# Patient Record
Sex: MALE
[De-identification: names, ages, dates, MRNs, and addresses within clinical notes are randomized per-mention and may not be internally consistent; named-entity substitution may affect disease eponyms.]

---

## 2018-12-29 ENCOUNTER — HOSPITAL ENCOUNTER (INPATIENT)
Dept: HOSPITAL 5 - ED | Age: 48
LOS: 1 days | Discharge: LEFT BEFORE DISCHARGE | DRG: 603 | End: 2018-12-30
Attending: INTERNAL MEDICINE
Payer: COMMERCIAL

## 2018-12-29 DIAGNOSIS — L03.114: Primary | ICD-10-CM

## 2018-12-29 DIAGNOSIS — F17.200: ICD-10-CM

## 2018-12-29 DIAGNOSIS — Z80.9: ICD-10-CM

## 2018-12-29 DIAGNOSIS — F11.10: ICD-10-CM

## 2018-12-29 DIAGNOSIS — Z53.21: ICD-10-CM

## 2018-12-29 LAB
ALBUMIN SERPL-MCNC: 4 G/DL (ref 3.9–5)
ALT SERPL-CCNC: 38 UNITS/L (ref 7–56)
BASOPHILS # (AUTO): 0.1 K/MM3 (ref 0–0.1)
BASOPHILS NFR BLD AUTO: 0.6 % (ref 0–1.8)
BUN SERPL-MCNC: 16 MG/DL (ref 9–20)
BUN/CREAT SERPL: 20 %
CALCIUM SERPL-MCNC: 9.3 MG/DL (ref 8.4–10.2)
EOSINOPHIL # BLD AUTO: 0.1 K/MM3 (ref 0–0.4)
EOSINOPHIL NFR BLD AUTO: 0.9 % (ref 0–4.3)
HCT VFR BLD CALC: 46.5 % (ref 35.5–45.6)
HEMOLYSIS INDEX: 8
HGB BLD-MCNC: 16 GM/DL (ref 11.8–15.2)
LYMPHOCYTES # BLD AUTO: 1.1 K/MM3 (ref 1.2–5.4)
LYMPHOCYTES NFR BLD AUTO: 12.5 % (ref 13.4–35)
MCH RBC QN AUTO: 32 PG (ref 28–32)
MCHC RBC AUTO-ENTMCNC: 34 % (ref 32–34)
MCV RBC AUTO: 94 FL (ref 84–94)
MONOCYTES # (AUTO): 0.9 K/MM3 (ref 0–0.8)
MONOCYTES % (AUTO): 10 % (ref 0–7.3)
PLATELET # BLD: 180 K/MM3 (ref 140–440)
RBC # BLD AUTO: 4.96 M/MM3 (ref 3.65–5.03)

## 2018-12-29 PROCEDURE — 82550 ASSAY OF CK (CPK): CPT

## 2018-12-29 PROCEDURE — 99406 BEHAV CHNG SMOKING 3-10 MIN: CPT

## 2018-12-29 PROCEDURE — 90715 TDAP VACCINE 7 YRS/> IM: CPT

## 2018-12-29 PROCEDURE — 87116 MYCOBACTERIA CULTURE: CPT

## 2018-12-29 PROCEDURE — 90686 IIV4 VACC NO PRSV 0.5 ML IM: CPT

## 2018-12-29 PROCEDURE — 87076 CULTURE ANAEROBE IDENT EACH: CPT

## 2018-12-29 PROCEDURE — 85652 RBC SED RATE AUTOMATED: CPT

## 2018-12-29 PROCEDURE — 87186 SC STD MICRODIL/AGAR DIL: CPT

## 2018-12-29 PROCEDURE — 85025 COMPLETE CBC W/AUTO DIFF WBC: CPT

## 2018-12-29 PROCEDURE — 82140 ASSAY OF AMMONIA: CPT

## 2018-12-29 PROCEDURE — 87040 BLOOD CULTURE FOR BACTERIA: CPT

## 2018-12-29 PROCEDURE — 36415 COLL VENOUS BLD VENIPUNCTURE: CPT

## 2018-12-29 PROCEDURE — 82805 BLOOD GASES W/O2 SATURATION: CPT

## 2018-12-29 PROCEDURE — 80307 DRUG TEST PRSMV CHEM ANLYZR: CPT

## 2018-12-29 PROCEDURE — 80053 COMPREHEN METABOLIC PANEL: CPT

## 2018-12-29 NOTE — EMERGENCY DEPARTMENT REPORT
ED Extremity Problem HPI





- General


Chief complaint: Extremity Problem,Nontraumatic


Stated complaint: SWOLLEN L ARM


Time Seen by Provider: 12/29/18 22:53


Source: patient, old records reviewed


Mode of arrival: Ambulatory


Limitations: No Limitations





- History of Present Illness


Initial comments: 





48-year-old right-hand dominant male with a past medical history previous staph 

infection presents to hospital complaining of pain, swelling, redness, and 

drainage from left arm and wrist.  Patient admits to shooting up heroin in the 

mid left forearm 2 days ago prior to symptom onset.  Patient states he typically

does not use IV drugs but used to days ago because her friend had some heroin.  

Patient has history of bilateral axilla MRSA infections while he was 

incarcerated.  He cannot remember if he has received tetanus within 10 years.  

Patient complains of moderate to severe pain to his left forearm and wrist with 

watery serous drainage.  Subjective fever reported.





- Related Data


                                    Allergies











Allergy/AdvReac Type Severity Reaction Status Date / Time


 


No Known Allergies Allergy   Unverified 12/29/18 21:35














ED Review of Systems


ROS: 


Stated complaint: SWOLLEN L ARM


Other details as noted in HPI





Comment: All other systems reviewed and negative





ED Past Medical Hx





- Past Medical History


Additional medical history: Hx staph infection in Bilateral armpits





- Surgical History


Past Surgical History?: No





- Social History


Smoking Status: Heavy Tobacco Smoker


Substance Use Type: Marijuana





ED Physical Exam





- General


Limitations: No Limitations





- Other


Other exam information: 





General: No limitations, patient is alert in no acute distress


Head exam: Atraumatic, normocephalic


Eyes exam: Normal appearance, pupils equal reactive to light, extraocular 

movements intact


ENT: Moist mucous membrane, normal oropharynx


Neck exam: Normal inspection, full range of motion, no meningismus nontender


Respiratory exam: Clear to auscultation bilateral, no wheezes, rales, crackles


Cardiovascular: Normal rate and rhythm, normal heart sounds


Abdomen: Soft, nondistended, and  nontender, with normal bowel sounds, no 

rebound, or guarding


Extremity: Full range of motion.  Cellulitis/erythema and warmth noted to left 

volar forearm extending to wrists and proximal hand.  Wrist has 2 open sores 

with mild serous drainage and yellow exudate.


Back: Normal Inspection, full range of motion, no tenderness


Neurologic: Alert, oriented x3, cranial nerves intact, no motor or sensory def

icit


Psychiatric: normal affect, normal mood


Skin: Warm, dry, intact





ED Course


                                   Vital Signs











  12/29/18 12/29/18 12/29/18





  21:28 23:35 23:40


 


Temperature 98.6 F  98.1 F


 


Pulse Rate 105 H  88


 


Respiratory 20 18 18





Rate   


 


Blood Pressure 108/81  


 


Blood Pressure   105/71





[Right]   


 


O2 Sat by Pulse 100  99





Oximetry   














ED Medical Decision Making





- Lab Data


Result diagrams: 


                                 12/29/18 23:20





                                 12/29/18 23:20








                                   Lab Results











  12/29/18 12/29/18 12/29/18 Range/Units





  23:20 23:20 23:20 


 


WBC  8.9    (4.5-11.0)  K/mm3


 


RBC  4.96    (3.65-5.03)  M/mm3


 


Hgb  16.0 H    (11.8-15.2)  gm/dl


 


Hct  46.5 H    (35.5-45.6)  %


 


MCV  94    (84-94)  fl


 


MCH  32    (28-32)  pg


 


MCHC  34    (32-34)  %


 


RDW  13.6    (13.2-15.2)  %


 


Plt Count  180    (140-440)  K/mm3


 


Lymph % (Auto)  12.5 L    (13.4-35.0)  %


 


Mono % (Auto)  10.0 H    (0.0-7.3)  %


 


Eos % (Auto)  0.9    (0.0-4.3)  %


 


Baso % (Auto)  0.6    (0.0-1.8)  %


 


Lymph #  1.1 L    (1.2-5.4)  K/mm3


 


Mono #  0.9 H    (0.0-0.8)  K/mm3


 


Eos #  0.1    (0.0-0.4)  K/mm3


 


Baso #  0.1    (0.0-0.1)  K/mm3


 


Seg Neutrophils %  76.0 H    (40.0-70.0)  %


 


Seg Neutrophils #  6.8    (1.8-7.7)  K/mm3


 


VBG pH     (7.320-7.420)  


 


Sodium   137   (137-145)  mmol/L


 


Potassium   3.9   (3.6-5.0)  mmol/L


 


Chloride   96.7 L   ()  mmol/L


 


Carbon Dioxide   29   (22-30)  mmol/L


 


Anion Gap   15   mmol/L


 


BUN   16   (9-20)  mg/dL


 


Creatinine   0.8   (0.8-1.5)  mg/dL


 


Estimated GFR   > 60   ml/min


 


BUN/Creatinine Ratio   20   %


 


Glucose   97   ()  mg/dL


 


Lactic Acid    2.10 H*  (0.7-2.0)  mmol/L


 


Calcium   9.3   (8.4-10.2)  mg/dL


 


Total Bilirubin   0.70   (0.1-1.2)  mg/dL


 


AST   21   (5-40)  units/L


 


ALT   38   (7-56)  units/L


 


Alkaline Phosphatase   59   ()  units/L


 


Total Creatine Kinase     ()  units/L


 


Total Protein   7.2   (6.3-8.2)  g/dL


 


Albumin   4.0   (3.9-5)  g/dL


 


Albumin/Globulin Ratio   1.3   %














  12/29/18 12/29/18 Range/Units





  23:20 23:20 


 


WBC    (4.5-11.0)  K/mm3


 


RBC    (3.65-5.03)  M/mm3


 


Hgb    (11.8-15.2)  gm/dl


 


Hct    (35.5-45.6)  %


 


MCV    (84-94)  fl


 


MCH    (28-32)  pg


 


MCHC    (32-34)  %


 


RDW    (13.2-15.2)  %


 


Plt Count    (140-440)  K/mm3


 


Lymph % (Auto)    (13.4-35.0)  %


 


Mono % (Auto)    (0.0-7.3)  %


 


Eos % (Auto)    (0.0-4.3)  %


 


Baso % (Auto)    (0.0-1.8)  %


 


Lymph #    (1.2-5.4)  K/mm3


 


Mono #    (0.0-0.8)  K/mm3


 


Eos #    (0.0-0.4)  K/mm3


 


Baso #    (0.0-0.1)  K/mm3


 


Seg Neutrophils %    (40.0-70.0)  %


 


Seg Neutrophils #    (1.8-7.7)  K/mm3


 


VBG pH  7.375   (7.320-7.420)  


 


Sodium    (137-145)  mmol/L


 


Potassium    (3.6-5.0)  mmol/L


 


Chloride    ()  mmol/L


 


Carbon Dioxide    (22-30)  mmol/L


 


Anion Gap    mmol/L


 


BUN    (9-20)  mg/dL


 


Creatinine    (0.8-1.5)  mg/dL


 


Estimated GFR    ml/min


 


BUN/Creatinine Ratio    %


 


Glucose    ()  mg/dL


 


Lactic Acid    (0.7-2.0)  mmol/L


 


Calcium    (8.4-10.2)  mg/dL


 


Total Bilirubin    (0.1-1.2)  mg/dL


 


AST    (5-40)  units/L


 


ALT    (7-56)  units/L


 


Alkaline Phosphatase    ()  units/L


 


Total Creatine Kinase   118  ()  units/L


 


Total Protein    (6.3-8.2)  g/dL


 


Albumin    (3.9-5)  g/dL


 


Albumin/Globulin Ratio    %














- Radiology Data


Radiology results: report reviewed





FINAL REPORT 





EXAM: XR FOREARM LT 





HISTORY: infection 





COMPARISON: None available. 





FINDINGS: 


Two views of the left forearm obtained. Bony structures are intact. Joint spaces

 are preserved. No


acute fracture dislocation. No focal bony erosive changes. 





IMPRESSION: 


No acute bony abnormality. 











- Medical Decision Making





pt given vanc for mrsa


cultures pending


+IVDA


tetanus provided


30 ml/kg bolus of NS


hosp informed of admission





- Differential Diagnosis


cellulitis, MRSA, abscesses, IV drug abuse


Critical Care Time: No


Critical care attestation.: 


If time is entered above; I have spent that time in minutes in the direct care 

of this critically ill patient, excluding procedure time.








ED Disposition


Clinical Impression: 


 Left arm cellulitis, Heroin abuse, IV drug abuse, Hx MRSA infection





Disposition: DC-09 OP ADMIT IP TO THIS HOSP


Is pt being admited?: Yes


Condition: Stable


Time of Disposition: 00:01 (Dr Carcamo/hosp)

## 2018-12-29 NOTE — XRAY REPORT
FINAL REPORT



EXAM:  XR FOREARM LT



HISTORY:  infection 



COMPARISON:  None available. 



FINDINGS:  

Two views of the left forearm obtained. Bony structures are intact.  Joint spaces are preserved. No a
cute fracture dislocation. No focal bony erosive changes. 



IMPRESSION:  

No acute bony abnormality.

## 2018-12-30 VITALS — SYSTOLIC BLOOD PRESSURE: 105 MMHG | DIASTOLIC BLOOD PRESSURE: 71 MMHG

## 2018-12-30 LAB
BENZODIAZEPINES SCREEN,URINE: (no result)
METHADONE SCREEN,URINE: (no result)
OPIATE SCREEN,URINE: (no result)

## 2018-12-30 RX ADMIN — SODIUM CHLORIDE SCH MLS/HR: 0.9 INJECTION, SOLUTION INTRAVENOUS at 02:16

## 2018-12-30 RX ADMIN — OXYCODONE AND ACETAMINOPHEN PRN TAB: 5; 325 TABLET ORAL at 02:13

## 2018-12-30 RX ADMIN — OXYCODONE AND ACETAMINOPHEN PRN TAB: 5; 325 TABLET ORAL at 08:48

## 2018-12-30 RX ADMIN — SODIUM CHLORIDE SCH MLS/HR: 0.9 INJECTION, SOLUTION INTRAVENOUS at 13:32

## 2018-12-30 RX ADMIN — OXYCODONE AND ACETAMINOPHEN PRN TAB: 5; 325 TABLET ORAL at 16:10

## 2018-12-30 NOTE — HISTORY AND PHYSICAL REPORT
History of Present Illness


Date of examination: 12/30/18


Date of admission: 


12/30/18 01:09





Chief complaint: 





 pain, swelling, redness, and drainage from left arm and wrist


History of present illness: 





Pt is a 48 WM with PMHx of IV heroin used disorder and prior staph infection who

presents to the ER with c/o left had and wrist pain, swelling and redness. Pt 

said that his cousin and friends came over his place and they were shooting up 

heroin, he states that "he doesn't really do that anymore", he just move to 

[place] to find his way. Pt has visible wound from the left dorsum to the left 

wrist in different stages of healing, the left wrist wound is open, draining 

purulent drainage mixed with blood. Pt admits to prior  skin infection with MRSA

while he was incarcerated, a few years ago, reports smoking 2ppd of cigarettes, 

he denies fever, reports chills, denies nausea, denies vomiting, he reports 

feeling sick on his stomach and severe pain in the wrist, which prompt him to 

come to the ER today. Pt was initially  treated in the ER, he is being admitted 

for further treatment for his left wrist cellulites. 





Past History


Past Medical History: No medical history


Past Surgical History: No surgical history


Social history: no significant social history, smoking (2ppd)


Family history: cancer (grand mother, father)





Medications and Allergies


                                    Allergies











Allergy/AdvReac Type Severity Reaction Status Date / Time


 


No Known Allergies Allergy   Unverified 12/29/18 21:35











Active Meds: 


Active Medications





Acetaminophen (Tylenol)  650 mg PO Q4H PRN


   PRN Reason: Pain MILD(1-3)/Fever >100.5/HA


Sodium Chloride (Nacl 0.9% 1000 Ml)  1,000 mls @ 100 mls/hr IV AS DIRECT CAROL


   Last Admin: 12/30/18 02:16 Dose:  100 mls/hr


   Documented by: 


Ondansetron HCl (Zofran)  4 mg IV Q8H PRN


   PRN Reason: Nausea And Vomiting


Oxycodone/Acetaminophen (Percocet 5/325)  1 tab PO Q6H PRN


   PRN Reason: Pain, Moderate (4-6)


   Last Admin: 12/30/18 02:13 Dose:  1 tab


   Documented by: 


Sodium Chloride (Sodium Chloride Flush Syringe 10 Ml)  10 ml IV BID CAROL


Sodium Chloride (Sodium Chloride Flush Syringe 10 Ml)  10 ml IV PRN PRN


   PRN Reason: LINE FLUSH











Review of Systems


Musculoskeletal: shooting arm pain, myalgias





Exam





- Constitutional


Vitals: 


                                        











Temp Pulse Resp BP Pulse Ox


 


 98.2 F   78   16   110/63   98 


 


 12/30/18 02:54  12/30/18 03:03  12/30/18 03:03  12/30/18 02:54  12/30/18 02:54











General appearance: Present: no acute distress





- EENT


Eyes: Present: EOM intact


ENT: hearing intact





- Neck


Neck: Present: supple, normal ROM





- Respiratory


Respiratory effort: normal


Respiratory: bilateral: CTA





- Cardiovascular


Rhythm: regular


Heart Sounds: Present: S1 & S2





- Extremities


Extremities: no ischemia


Peripheral Pulses: within normal limits





- Abdominal


General gastrointestinal: Present: non-tender, non-distended


Male genitourinary: Present: deferred





- Rectal


Rectal Exam: deferred





- Integumentary


Integumentary: Present: warm, dry, erythema





- Musculoskeletal


Musculoskeletal: strength equal bilaterally





- Psychiatric


Psychiatric: appropriate mood/affect, cooperative





- Neurologic


Neurologic: moves all extremities





Results





- Labs


CBC & Chem 7: 


                                 12/29/18 23:20





                                 12/29/18 23:20


Labs: 


                             Laboratory Last Values











WBC  8.9 K/mm3 (4.5-11.0)   12/29/18  23:20    


 


RBC  4.96 M/mm3 (3.65-5.03)   12/29/18  23:20    


 


Hgb  16.0 gm/dl (11.8-15.2)  H  12/29/18  23:20    


 


Hct  46.5 % (35.5-45.6)  H  12/29/18  23:20    


 


MCV  94 fl (84-94)   12/29/18  23:20    


 


MCH  32 pg (28-32)   12/29/18  23:20    


 


MCHC  34 % (32-34)   12/29/18  23:20    


 


RDW  13.6 % (13.2-15.2)   12/29/18  23:20    


 


Plt Count  180 K/mm3 (140-440)   12/29/18  23:20    


 


Lymph % (Auto)  12.5 % (13.4-35.0)  L  12/29/18  23:20    


 


Mono % (Auto)  10.0 % (0.0-7.3)  H  12/29/18  23:20    


 


Eos % (Auto)  0.9 % (0.0-4.3)   12/29/18  23:20    


 


Baso % (Auto)  0.6 % (0.0-1.8)   12/29/18  23:20    


 


Lymph #  1.1 K/mm3 (1.2-5.4)  L  12/29/18  23:20    


 


Mono #  0.9 K/mm3 (0.0-0.8)  H  12/29/18  23:20    


 


Eos #  0.1 K/mm3 (0.0-0.4)   12/29/18  23:20    


 


Baso #  0.1 K/mm3 (0.0-0.1)   12/29/18  23:20    


 


Seg Neutrophils %  76.0 % (40.0-70.0)  H  12/29/18  23:20    


 


Seg Neutrophils #  6.8 K/mm3 (1.8-7.7)   12/29/18  23:20    


 


ESR  38 mm/Hr (0-20)   12/29/18  23:20    


 


VBG pH  7.375  (7.320-7.420)   12/29/18  23:20    


 


Sodium  137 mmol/L (137-145)   12/29/18  23:20    


 


Potassium  3.9 mmol/L (3.6-5.0)   12/29/18  23:20    


 


Chloride  96.7 mmol/L ()  L  12/29/18  23:20    


 


Carbon Dioxide  29 mmol/L (22-30)   12/29/18  23:20    


 


Anion Gap  15 mmol/L  12/29/18  23:20    


 


BUN  16 mg/dL (9-20)   12/29/18  23:20    


 


Creatinine  0.8 mg/dL (0.8-1.5)   12/29/18  23:20    


 


Estimated GFR  > 60 ml/min  12/29/18  23:20    


 


BUN/Creatinine Ratio  20 %  12/29/18  23:20    


 


Glucose  97 mg/dL ()   12/29/18  23:20    


 


Lactic Acid  1.00 mmol/L (0.7-2.0)   12/30/18  01:57    


 


Calcium  9.3 mg/dL (8.4-10.2)   12/29/18  23:20    


 


Total Bilirubin  0.70 mg/dL (0.1-1.2)   12/29/18  23:20    


 


AST  21 units/L (5-40)   12/29/18  23:20    


 


ALT  38 units/L (7-56)   12/29/18  23:20    


 


Alkaline Phosphatase  59 units/L ()   12/29/18  23:20    


 


Total Creatine Kinase  118 units/L ()   12/29/18  23:20    


 


Total Protein  7.2 g/dL (6.3-8.2)   12/29/18  23:20    


 


Albumin  4.0 g/dL (3.9-5)   12/29/18  23:20    


 


Albumin/Globulin Ratio  1.3 %  12/29/18  23:20    














Assessment and Plan


Assessment and plan: 





1. Left wrist cellulites


2. Heroin use disorder


3. Tobacco use disorder





Plan:


Admit to medsurg


Monitor VS per unit protocol


IVF for hydration


IV Vanco Rx to dose


IX Rocephin q24 hrs


Nicotine patch for tobacco withdrawal 


Continue current medication


Pain management with Percocet PRN


Further plan per hospital coursePlan was d/w pt, voiced understanding 


Pt's condition and plan of care was d/w Dr Carcamo.


Advance Directives: Yes


VTE prophylaxis?: Mechanical


Plan of care discussed with patient/family: Yes